# Patient Record
Sex: FEMALE | Race: WHITE | Employment: FULL TIME | URBAN - METROPOLITAN AREA
[De-identification: names, ages, dates, MRNs, and addresses within clinical notes are randomized per-mention and may not be internally consistent; named-entity substitution may affect disease eponyms.]

---

## 2017-01-04 ENCOUNTER — OFFICE VISIT (OUTPATIENT)
Dept: FAMILY MEDICINE CLINIC | Facility: CLINIC | Age: 67
End: 2017-01-04

## 2017-01-04 VITALS — HEART RATE: 71 BPM | OXYGEN SATURATION: 97 % | DIASTOLIC BLOOD PRESSURE: 74 MMHG | SYSTOLIC BLOOD PRESSURE: 104 MMHG

## 2017-01-04 DIAGNOSIS — H01.003: Primary | ICD-10-CM

## 2017-01-04 DIAGNOSIS — L01.00: Primary | ICD-10-CM

## 2017-01-04 DIAGNOSIS — K29.30 CHRONIC SUPERFICIAL GASTRITIS WITHOUT BLEEDING: ICD-10-CM

## 2017-01-04 PROCEDURE — 99213 OFFICE O/P EST LOW 20 MIN: CPT

## 2017-01-04 RX ORDER — CETIRIZINE HYDROCHLORIDE 10 MG/1
10 TABLET ORAL DAILY
Qty: 30 TABLET | Refills: 0 | Status: SHIPPED | OUTPATIENT
Start: 2017-01-04 | End: 2017-02-17 | Stop reason: ALTCHOICE

## 2017-01-04 RX ORDER — OMEPRAZOLE 40 MG/1
40 CAPSULE, DELAYED RELEASE ORAL DAILY
Qty: 90 CAPSULE | Refills: 1 | Status: SHIPPED | OUTPATIENT
Start: 2017-01-04 | End: 2017-05-19

## 2017-01-04 NOTE — PATIENT INSTRUCTIONS
Tratamiento de la blefaritis: Medicamentos y chequeos  Medicamentos  Rangel oftalmólogo podría recetarle colirios o pomadas para aliviar el enrojecimiento, la hinchazón y la irritación.  [place], evite tocarse el párpado con la punt

## 2017-01-04 NOTE — PROGRESS NOTES
HPI:    Patient ID: James Samples is a 77year old female. Eye Problem   The right eye is affected. This is a new problem. Episode onset: 2 weeks. The problem occurs constantly. The problem has been gradually worsening.  The patient is experiencing no pain daily with breakfast. Disp: 90 tablet Rfl: 0   Losartan Potassium-HCTZ 100-25 MG Oral Tab Take 1 tablet by mouth daily. Disp: 90 tablet Rfl: 1   methylPREDNISolone (MEDROL) 4 MG Oral Tablet Therapy Pack As directed.  Disp: 1 kit Rfl: 0   Azelastine HCl 0.05 1 Application topically 2 (two) times daily. cetirizine 10 MG Oral Tab 30 tablet 0      Sig: Take 1 tablet (10 mg total) by mouth daily.       Omeprazole 40 MG Oral Capsule Delayed Release 90 capsule 1      Sig: Take 1 capsule (40 mg total) by mouth da

## 2017-01-18 ENCOUNTER — TELEPHONE (OUTPATIENT)
Dept: FAMILY MEDICINE CLINIC | Facility: CLINIC | Age: 67
End: 2017-01-18

## 2017-01-19 ENCOUNTER — TELEPHONE (OUTPATIENT)
Dept: FAMILY MEDICINE CLINIC | Facility: CLINIC | Age: 67
End: 2017-01-19

## 2017-01-20 ENCOUNTER — OFFICE VISIT (OUTPATIENT)
Dept: FAMILY MEDICINE CLINIC | Facility: CLINIC | Age: 67
End: 2017-01-20

## 2017-01-20 VITALS
WEIGHT: 155 LBS | OXYGEN SATURATION: 95 % | HEART RATE: 74 BPM | HEIGHT: 59.5 IN | SYSTOLIC BLOOD PRESSURE: 116 MMHG | DIASTOLIC BLOOD PRESSURE: 80 MMHG | BODY MASS INDEX: 30.84 KG/M2

## 2017-01-20 DIAGNOSIS — L23.9 ALLERGIC DERMATITIS: Primary | ICD-10-CM

## 2017-01-20 PROCEDURE — 99213 OFFICE O/P EST LOW 20 MIN: CPT

## 2017-01-20 RX ORDER — RANITIDINE 300 MG/1
300 TABLET ORAL DAILY
Qty: 7 TABLET | Refills: 0 | Status: SHIPPED | OUTPATIENT
Start: 2017-01-20 | End: 2017-01-27

## 2017-01-20 RX ORDER — PREDNISONE 20 MG/1
60 TABLET ORAL DAILY
Qty: 15 TABLET | Refills: 0 | Status: SHIPPED | OUTPATIENT
Start: 2017-01-20 | End: 2017-01-25

## 2017-01-21 NOTE — PATIENT INSTRUCTIONS
Urticaria (adulto)  La urticaria es leanna afección en la que hay protuberancias rosadas o rojizas en la piel. Estas protuberancias también se conocen amie “ronchas”. [place] [employer], arder o pinchar.  Pueden aparecer en cualquier parte del c · Pruebe a ponerse leanna crema o espray tópico con benzocaína para ayuda a reducir la picazón. · Use difenhidramina oral para ayudar a reducir la picazón. Es un antihistamínico que puede comprar en farmacias y tiendas de alimentos.  Puede que le cause somnol

## 2017-01-21 NOTE — PROGRESS NOTES
HPI:    Patient ID: Enoch Reilly is a 77year old female. Rash  This is a recurrent problem. The current episode started more than 1 month ago. The problem has been waxing and waning since onset. The affected locations include the face.  The rash is areli Carb-Cholecalciferol (CALCIUM-VITAMIN D3) 600-400 MG-UNIT Oral Tab Take 1 tablet by mouth 2 (two) times daily.  Disp: 180 tablet Rfl: 3   MetFORMIN HCl 850 MG Oral Tab Take 1 tablet (850 mg total) by mouth daily with breakfast. Disp: 90 tablet Rfl: 0   Losa MG Oral Tab 7 tablet 0      Sig: Take 1 tablet (300 mg total) by mouth daily.            Imaging & Referrals:  None       #[id number]

## 2017-02-17 ENCOUNTER — OFFICE VISIT (OUTPATIENT)
Dept: FAMILY MEDICINE CLINIC | Facility: CLINIC | Age: 67
End: 2017-02-17

## 2017-02-17 VITALS
SYSTOLIC BLOOD PRESSURE: 138 MMHG | BODY MASS INDEX: 31 KG/M2 | HEART RATE: 73 BPM | WEIGHT: 154 LBS | DIASTOLIC BLOOD PRESSURE: 70 MMHG | OXYGEN SATURATION: 99 %

## 2017-02-17 DIAGNOSIS — Z01.89 ENCOUNTER FOR ROUTINE LABORATORY TESTING: ICD-10-CM

## 2017-02-17 DIAGNOSIS — I10 ESSENTIAL HYPERTENSION WITH GOAL BLOOD PRESSURE LESS THAN 140/90: ICD-10-CM

## 2017-02-17 DIAGNOSIS — E11.3293 CONTROLLED TYPE 2 DIABETES MELLITUS WITH BOTH EYES AFFECTED BY MILD NONPROLIFERATIVE RETINOPATHY WITHOUT MACULAR EDEMA, WITHOUT LONG-TERM CURRENT USE OF INSULIN (HCC): ICD-10-CM

## 2017-02-17 DIAGNOSIS — J01.01 ACUTE RECURRENT MAXILLARY SINUSITIS: Primary | ICD-10-CM

## 2017-02-17 DIAGNOSIS — L30.9 DERMATITIS: ICD-10-CM

## 2017-02-17 LAB
ALBUMIN SERPL BCP-MCNC: 4.2 G/DL (ref 3.5–4.8)
ALBUMIN/GLOB SERPL: 1.8 {RATIO} (ref 1–2)
ALP SERPL-CCNC: 66 U/L (ref 32–100)
ALT SERPL-CCNC: 17 U/L (ref 14–54)
ANION GAP SERPL CALC-SCNC: 8 MMOL/L (ref 0–18)
AST SERPL-CCNC: 19 U/L (ref 15–41)
BILIRUB SERPL-MCNC: 0.3 MG/DL (ref 0.3–1.2)
BUN SERPL-MCNC: 17 MG/DL (ref 8–20)
BUN/CREAT SERPL: 20.2 (ref 10–20)
CALCIUM SERPL-MCNC: 9.4 MG/DL (ref 8.5–10.5)
CHLORIDE SERPL-SCNC: 104 MMOL/L (ref 95–110)
CHOLEST SERPL-MCNC: 218 MG/DL (ref 110–200)
CO2 SERPL-SCNC: 27 MMOL/L (ref 22–32)
CREAT SERPL-MCNC: 0.84 MG/DL (ref 0.5–1.5)
ERYTHROCYTE [DISTWIDTH] IN BLOOD BY AUTOMATED COUNT: 15.2 % (ref 11–15)
GLOBULIN PLAS-MCNC: 2.4 G/DL (ref 2.5–3.7)
GLUCOSE SERPL-MCNC: 97 MG/DL (ref 70–99)
HCT VFR BLD AUTO: 30.2 % (ref 35–48)
HDLC SERPL-MCNC: 49 MG/DL
HGB BLD-MCNC: 9.5 G/DL (ref 12–16)
LDLC SERPL CALC-MCNC: 126 MG/DL (ref 0–99)
MCH RBC QN AUTO: 19.3 PG (ref 27–32)
MCHC RBC AUTO-ENTMCNC: 31.4 G/DL (ref 32–37)
MCV RBC AUTO: 61.5 FL (ref 80–100)
NONHDLC SERPL-MCNC: 169 MG/DL
OSMOLALITY UR CALC.SUM OF ELEC: 289 MOSM/KG (ref 275–295)
PLATELET # BLD AUTO: 153 K/UL (ref 140–400)
PMV BLD AUTO: 11.2 FL (ref 7.4–10.3)
POTASSIUM SERPL-SCNC: 4.5 MMOL/L (ref 3.3–5.1)
PROT SERPL-MCNC: 6.6 G/DL (ref 5.9–8.4)
RBC # BLD AUTO: 4.91 M/UL (ref 3.7–5.4)
SODIUM SERPL-SCNC: 139 MMOL/L (ref 136–144)
TRIGL SERPL-MCNC: 217 MG/DL (ref 1–149)
WBC # BLD AUTO: 4.7 K/UL (ref 4–11)

## 2017-02-17 PROCEDURE — 80061 LIPID PANEL: CPT

## 2017-02-17 PROCEDURE — 83036 HEMOGLOBIN GLYCOSYLATED A1C: CPT

## 2017-02-17 PROCEDURE — 80053 COMPREHEN METABOLIC PANEL: CPT

## 2017-02-17 PROCEDURE — 36415 COLL VENOUS BLD VENIPUNCTURE: CPT

## 2017-02-17 PROCEDURE — 99214 OFFICE O/P EST MOD 30 MIN: CPT

## 2017-02-17 PROCEDURE — 85027 COMPLETE CBC AUTOMATED: CPT

## 2017-02-17 RX ORDER — LOSARTAN POTASSIUM AND HYDROCHLOROTHIAZIDE 25; 100 MG/1; MG/1
1 TABLET ORAL DAILY
Qty: 90 TABLET | Refills: 1 | Status: SHIPPED | OUTPATIENT
Start: 2017-02-17 | End: 2017-05-19

## 2017-02-17 RX ORDER — LEVOFLOXACIN 750 MG/1
750 TABLET ORAL DAILY
Qty: 5 TABLET | Refills: 0 | Status: SHIPPED | OUTPATIENT
Start: 2017-02-17 | End: 2017-02-22

## 2017-02-18 LAB — HBA1C MFR BLD: 6.5 % (ref 4–6)

## 2017-02-18 NOTE — PROGRESS NOTES
HPI:    Patient ID: Waqar Jluis is a 77year old female. Sinus Problem  This is a recurrent problem. Episode onset: 1 week ago. The problem has been gradually worsening since onset. There has been no fever. The pain is moderate.  Associated symptoms incl levofloxacin 750 MG Oral Tab Take 1 tablet (750 mg total) by mouth daily. Disp: 5 tablet Rfl: 0   Omeprazole 40 MG Oral Capsule Delayed Release Take 1 capsule (40 mg total) by mouth daily.  Disp: 90 capsule Rfl: 1   Calcium Carb-Cholecalciferol (CALCIUM-V baths and/or neti pot nasal rinses recommended. Rx for Levaquin given. 2. Dermatitis  Will refer to Dr. Gina Parmar (Dermatology) for evaluation.     3. 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

## 2017-02-18 NOTE — PATIENT INSTRUCTIONS
Cómo prevenir la sinusitis    Los resfriados, la gripe y las alergias pueden conducir fácilmente a la sinusitis.  Leonor todo lo que pueda para prevenir la sinusitis evitando estos problemas que la causan; procure a amie dé lugar no contraer resfriados u ot Si mantiene húmedos blake senos paranasales, el moco será más líquido y se podrá drenar más fácilmente; a francisco vez, esto ayuda a prevenir infecciones.  Pregunte a francisco médico sobre estas sugerencias:  · Use un humidificador; limpie regularmente el depósito para e

## 2017-02-20 DIAGNOSIS — E78.2 MIXED HYPERLIPIDEMIA: ICD-10-CM

## 2017-02-20 DIAGNOSIS — E11.3299 CONTROLLED TYPE 2 DIABETES MELLITUS WITH MILD NONPROLIFERATIVE RETINOPATHY WITHOUT MACULAR EDEMA, WITHOUT LONG-TERM CURRENT USE OF INSULIN, UNSPECIFIED LATERALITY (HCC): Primary | ICD-10-CM

## 2017-02-20 DIAGNOSIS — D50.9 IRON DEFICIENCY ANEMIA, UNSPECIFIED IRON DEFICIENCY ANEMIA TYPE: ICD-10-CM

## 2017-02-20 RX ORDER — SIMVASTATIN 20 MG
20 TABLET ORAL NIGHTLY
Qty: 90 TABLET | Refills: 0 | Status: SHIPPED | OUTPATIENT
Start: 2017-02-20 | End: 2017-05-19

## 2017-02-20 RX ORDER — MELATONIN
325 2 TIMES DAILY WITH MEALS
Qty: 180 TABLET | Refills: 0 | Status: SHIPPED | OUTPATIENT
Start: 2017-02-20 | End: 2017-05-19

## 2017-02-21 ENCOUNTER — TELEPHONE (OUTPATIENT)
Dept: FAMILY MEDICINE CLINIC | Facility: CLINIC | Age: 67
End: 2017-02-21

## 2017-02-21 RX ORDER — NAPROXEN 500 MG/1
500 TABLET ORAL 2 TIMES DAILY WITH MEALS
Qty: 60 TABLET | Refills: 0 | Status: SHIPPED | OUTPATIENT
Start: 2017-02-21 | End: 2017-03-23

## 2017-02-27 ENCOUNTER — TELEPHONE (OUTPATIENT)
Dept: FAMILY MEDICINE CLINIC | Facility: CLINIC | Age: 67
End: 2017-02-27

## 2017-03-10 ENCOUNTER — OFFICE VISIT (OUTPATIENT)
Dept: FAMILY MEDICINE CLINIC | Facility: CLINIC | Age: 67
End: 2017-03-10

## 2017-03-10 VITALS
BODY MASS INDEX: 30 KG/M2 | SYSTOLIC BLOOD PRESSURE: 120 MMHG | DIASTOLIC BLOOD PRESSURE: 70 MMHG | RESPIRATION RATE: 16 BRPM | OXYGEN SATURATION: 98 % | WEIGHT: 152 LBS | HEART RATE: 70 BPM

## 2017-03-10 DIAGNOSIS — M77.01 MEDIAL EPICONDYLITIS OF RIGHT ELBOW: ICD-10-CM

## 2017-03-10 DIAGNOSIS — M26.623 BILATERAL TEMPOROMANDIBULAR JOINT PAIN: Primary | ICD-10-CM

## 2017-03-10 DIAGNOSIS — M79.631 RIGHT FOREARM PAIN: ICD-10-CM

## 2017-03-10 PROCEDURE — 99213 OFFICE O/P EST LOW 20 MIN: CPT

## 2017-03-10 RX ORDER — METHYLPREDNISOLONE 4 MG/1
TABLET ORAL
Qty: 1 KIT | Refills: 0 | Status: SHIPPED | OUTPATIENT
Start: 2017-03-10 | End: 2017-05-19 | Stop reason: ALTCHOICE

## 2017-03-10 RX ORDER — HYDROCODONE BITARTRATE AND IBUPROFEN 7.5; 2 MG/1; MG/1
1 TABLET, FILM COATED ORAL EVERY 8 HOURS PRN
Qty: 90 TABLET | Refills: 0 | Status: SHIPPED | OUTPATIENT
Start: 2017-03-10 | End: 2017-04-21

## 2017-03-11 PROBLEM — M79.631 RIGHT FOREARM PAIN: Status: ACTIVE | Noted: 2017-03-11

## 2017-03-11 PROBLEM — L01.00: Status: RESOLVED | Noted: 2017-01-04 | Resolved: 2017-03-11

## 2017-03-11 PROBLEM — L23.9 ALLERGIC DERMATITIS: Status: RESOLVED | Noted: 2017-01-20 | Resolved: 2017-03-11

## 2017-03-11 PROBLEM — M77.02 MEDIAL EPICONDYLITIS OF LEFT ELBOW: Status: ACTIVE | Noted: 2017-03-11

## 2017-03-11 PROBLEM — M85.80 OSTEOPENIA: Status: ACTIVE | Noted: 2017-03-11

## 2017-03-11 PROBLEM — M77.01 MEDIAL EPICONDYLITIS OF RIGHT ELBOW: Status: ACTIVE | Noted: 2017-03-11

## 2017-03-11 PROBLEM — D50.9 MICROCYTIC ANEMIA: Status: ACTIVE | Noted: 2017-03-11

## 2017-03-11 PROBLEM — H01.003: Status: RESOLVED | Noted: 2017-01-04 | Resolved: 2017-03-11

## 2017-03-11 PROBLEM — Z01.89 ENCOUNTER FOR ROUTINE LABORATORY TESTING: Status: RESOLVED | Noted: 2017-02-17 | Resolved: 2017-03-11

## 2017-03-11 PROBLEM — M26.623 BILATERAL TEMPOROMANDIBULAR JOINT PAIN: Status: ACTIVE | Noted: 2017-03-11

## 2017-03-11 NOTE — PROGRESS NOTES
HPI:    Patient ID: Alber Strickland is a 77year old female. Arm Pain   The pain is present in the right arm and right elbow. This is a new problem. Episode onset: 2 weeks ago. There has been no history of extremity trauma. The problem occurs constantly.  Marlene Olmedo methylPREDNISolone (MEDROL) 4 MG Oral Tablet Therapy Pack As directed. Disp: 1 kit Rfl: 0   naproxen 500 MG Oral Tab Take 1 tablet (500 mg total) by mouth 2 (two) times daily with meals.  Disp: 60 tablet Rfl: 0   MetFORMIN HCl 850 MG Oral Tab Take 1 table epicondylitis of right elbow  3. Right forearm pain  RICE (Rest, Icing, Compression, and Elevation) technique reviewed and discussed with pt. Rx for Medrol dose pack and Vicoprofen given. RTC if symptoms worsen or fail to improve.       No orders of the

## 2017-03-11 NOTE — PATIENT INSTRUCTIONS
Los trastornos temporomandibulares (TTM)  ¿Tiene dolor en la jadyn, en la mandíbula o en los dientes? ¿Tiene dificultades para masticar? ¿Oye chasquidos o ruidos secos procedentes de la mandíbula?  Estos síntomas pueden ser causados por trastornos temporom · Fisioterapia para reducir la presión [employer] articulación y restablecer francisco funcionamiento. · Tratamiento dental para reducir la presión sobre la articulación. ¿Cómo puede evitar problemas en un futuro?   El tratamiento puede ayudarle a aliviar francisco probl

## 2017-04-07 ENCOUNTER — TELEPHONE (OUTPATIENT)
Dept: FAMILY MEDICINE CLINIC | Facility: CLINIC | Age: 67
End: 2017-04-07

## 2017-04-07 DIAGNOSIS — H40.9 GLAUCOMA OF BOTH EYES, UNSPECIFIED GLAUCOMA: Primary | ICD-10-CM

## 2017-04-07 NOTE — TELEPHONE ENCOUNTER
Referral can not be issue to go see Dr Titus Angulo but he is not longer under [facility], she got referred to Dr Arminda Cotter instead.   Patient is aware to call me back on Monday to find out the status of the referral.

## 2017-04-10 ENCOUNTER — TELEPHONE (OUTPATIENT)
Dept: FAMILY MEDICINE CLINIC | Facility: CLINIC | Age: 67
End: 2017-04-10

## 2017-04-10 DIAGNOSIS — H40.9 GLAUCOMA OF BOTH EYES, UNSPECIFIED GLAUCOMA: Primary | ICD-10-CM

## 2017-04-21 ENCOUNTER — OFFICE VISIT (OUTPATIENT)
Dept: FAMILY MEDICINE CLINIC | Facility: CLINIC | Age: 67
End: 2017-04-21

## 2017-04-21 VITALS
BODY MASS INDEX: 30.24 KG/M2 | HEART RATE: 70 BPM | DIASTOLIC BLOOD PRESSURE: 70 MMHG | HEIGHT: 59.5 IN | TEMPERATURE: 98 F | WEIGHT: 152 LBS | SYSTOLIC BLOOD PRESSURE: 138 MMHG | OXYGEN SATURATION: 96 %

## 2017-04-21 DIAGNOSIS — H91.93 BILATERAL HEARING LOSS, UNSPECIFIED HEARING LOSS TYPE: ICD-10-CM

## 2017-04-21 DIAGNOSIS — J30.89 NON-SEASONAL ALLERGIC RHINITIS, UNSPECIFIED ALLERGIC RHINITIS TRIGGER: Primary | ICD-10-CM

## 2017-04-21 PROCEDURE — 99213 OFFICE O/P EST LOW 20 MIN: CPT

## 2017-04-21 RX ORDER — LEVOCETIRIZINE DIHYDROCHLORIDE 5 MG/1
5 TABLET, FILM COATED ORAL EVERY EVENING
Qty: 30 TABLET | Refills: 0 | Status: SHIPPED | OUTPATIENT
Start: 2017-04-21 | End: 2017-05-19

## 2017-04-21 RX ORDER — AZELASTINE HCL 205.5 UG/1
2 SPRAY NASAL 2 TIMES DAILY
Qty: 30 ML | Refills: 0 | Status: SHIPPED | OUTPATIENT
Start: 2017-04-21 | End: 2017-05-19

## 2017-04-21 RX ORDER — HYDROCODONE BITARTRATE AND IBUPROFEN 7.5; 2 MG/1; MG/1
1 TABLET, FILM COATED ORAL EVERY 8 HOURS PRN
Qty: 90 TABLET | Refills: 0 | Status: SHIPPED | OUTPATIENT
Start: 2017-04-21 | End: 2017-12-08 | Stop reason: ALTCHOICE

## 2017-04-24 RX ORDER — LEVOCETIRIZINE DIHYDROCHLORIDE 5 MG/1
TABLET, FILM COATED ORAL
Qty: 90 TABLET | Refills: 0 | OUTPATIENT
Start: 2017-04-24

## 2017-05-04 ENCOUNTER — TELEPHONE (OUTPATIENT)
Dept: FAMILY MEDICINE CLINIC | Facility: CLINIC | Age: 67
End: 2017-05-04

## 2017-05-17 ENCOUNTER — NURSE ONLY (OUTPATIENT)
Dept: FAMILY MEDICINE CLINIC | Facility: CLINIC | Age: 67
End: 2017-05-17

## 2017-05-17 DIAGNOSIS — E78.2 MIXED HYPERLIPIDEMIA: ICD-10-CM

## 2017-05-17 DIAGNOSIS — E11.3299 CONTROLLED TYPE 2 DIABETES MELLITUS WITH MILD NONPROLIFERATIVE RETINOPATHY WITHOUT MACULAR EDEMA, WITHOUT LONG-TERM CURRENT USE OF INSULIN, UNSPECIFIED LATERALITY (HCC): ICD-10-CM

## 2017-05-17 DIAGNOSIS — E11.3299 CONTROLLED TYPE 2 DIABETES MELLITUS WITH MILD NONPROLIFERATIVE RETINOPATHY WITHOUT MACULAR EDEMA, WITHOUT LONG-TERM CURRENT USE OF INSULIN (HCC): ICD-10-CM

## 2017-05-17 DIAGNOSIS — D50.9 IRON DEFICIENCY ANEMIA, UNSPECIFIED IRON DEFICIENCY ANEMIA TYPE: ICD-10-CM

## 2017-05-17 PROCEDURE — 85025 COMPLETE CBC W/AUTO DIFF WBC: CPT

## 2017-05-17 PROCEDURE — 36415 COLL VENOUS BLD VENIPUNCTURE: CPT

## 2017-05-17 PROCEDURE — 80061 LIPID PANEL: CPT

## 2017-05-17 PROCEDURE — 80053 COMPREHEN METABOLIC PANEL: CPT

## 2017-05-17 PROCEDURE — 83036 HEMOGLOBIN GLYCOSYLATED A1C: CPT

## 2017-05-19 ENCOUNTER — OFFICE VISIT (OUTPATIENT)
Dept: FAMILY MEDICINE CLINIC | Facility: CLINIC | Age: 67
End: 2017-05-19

## 2017-05-19 VITALS
HEART RATE: 80 BPM | SYSTOLIC BLOOD PRESSURE: 136 MMHG | WEIGHT: 153 LBS | DIASTOLIC BLOOD PRESSURE: 70 MMHG | BODY MASS INDEX: 30.84 KG/M2 | OXYGEN SATURATION: 97 % | HEIGHT: 59 IN

## 2017-05-19 DIAGNOSIS — Z13.31 DEPRESSION SCREENING: ICD-10-CM

## 2017-05-19 DIAGNOSIS — D50.9 MICROCYTIC ANEMIA: ICD-10-CM

## 2017-05-19 DIAGNOSIS — J30.89 NON-SEASONAL ALLERGIC RHINITIS, UNSPECIFIED ALLERGIC RHINITIS TRIGGER: ICD-10-CM

## 2017-05-19 DIAGNOSIS — I10 ESSENTIAL HYPERTENSION WITH GOAL BLOOD PRESSURE LESS THAN 140/90: ICD-10-CM

## 2017-05-19 DIAGNOSIS — K64.4 EXTERNAL HEMORRHOIDS: ICD-10-CM

## 2017-05-19 DIAGNOSIS — Z28.21 IMMUNIZATION NOT CARRIED OUT BECAUSE OF PATIENT REFUSAL: ICD-10-CM

## 2017-05-19 DIAGNOSIS — E11.3293 CONTROLLED TYPE 2 DIABETES MELLITUS WITH BOTH EYES AFFECTED BY MILD NONPROLIFERATIVE RETINOPATHY WITHOUT MACULAR EDEMA, WITHOUT LONG-TERM CURRENT USE OF INSULIN (HCC): ICD-10-CM

## 2017-05-19 DIAGNOSIS — H91.93 BILATERAL HEARING LOSS, UNSPECIFIED HEARING LOSS TYPE: ICD-10-CM

## 2017-05-19 DIAGNOSIS — J06.9 VIRAL UPPER RESPIRATORY TRACT INFECTION: ICD-10-CM

## 2017-05-19 DIAGNOSIS — E78.2 MIXED HYPERLIPIDEMIA: ICD-10-CM

## 2017-05-19 DIAGNOSIS — E66.09 NON MORBID OBESITY DUE TO EXCESS CALORIES: ICD-10-CM

## 2017-05-19 DIAGNOSIS — Z78.0 POSTMENOPAUSAL: ICD-10-CM

## 2017-05-19 DIAGNOSIS — M72.2 BILATERAL PLANTAR FASCIITIS: ICD-10-CM

## 2017-05-19 DIAGNOSIS — K57.30 DIVERTICULOSIS OF SIGMOID COLON: ICD-10-CM

## 2017-05-19 DIAGNOSIS — J34.2 DEVIATED NASAL SEPTUM: ICD-10-CM

## 2017-05-19 DIAGNOSIS — H40.9 GLAUCOMA OF BOTH EYES, UNSPECIFIED GLAUCOMA: ICD-10-CM

## 2017-05-19 DIAGNOSIS — Z00.00 ENCOUNTER FOR MEDICARE ANNUAL WELLNESS EXAM: Primary | ICD-10-CM

## 2017-05-19 DIAGNOSIS — M85.89 OSTEOPENIA OF MULTIPLE SITES: ICD-10-CM

## 2017-05-19 DIAGNOSIS — K29.30 CHRONIC SUPERFICIAL GASTRITIS WITHOUT BLEEDING: ICD-10-CM

## 2017-05-19 PROBLEM — L30.9 DERMATITIS: Status: RESOLVED | Noted: 2017-02-17 | Resolved: 2017-05-19

## 2017-05-19 PROBLEM — M79.631 RIGHT FOREARM PAIN: Status: RESOLVED | Noted: 2017-03-11 | Resolved: 2017-05-19

## 2017-05-19 PROBLEM — M77.01 MEDIAL EPICONDYLITIS OF RIGHT ELBOW: Status: RESOLVED | Noted: 2017-03-11 | Resolved: 2017-05-19

## 2017-05-19 PROCEDURE — 82728 ASSAY OF FERRITIN: CPT

## 2017-05-19 PROCEDURE — 81003 URINALYSIS AUTO W/O SCOPE: CPT

## 2017-05-19 PROCEDURE — 99397 PER PM REEVAL EST PAT 65+ YR: CPT

## 2017-05-19 PROCEDURE — 82570 ASSAY OF URINE CREATININE: CPT

## 2017-05-19 PROCEDURE — 99212 OFFICE O/P EST SF 10 MIN: CPT

## 2017-05-19 PROCEDURE — 96160 PT-FOCUSED HLTH RISK ASSMT: CPT

## 2017-05-19 PROCEDURE — 82043 UR ALBUMIN QUANTITATIVE: CPT

## 2017-05-19 PROCEDURE — G0438 PPPS, INITIAL VISIT: HCPCS

## 2017-05-19 RX ORDER — MULTIVIT,IRON,MINERALS/LUTEIN
1 TABLET ORAL 2 TIMES DAILY
Qty: 180 TABLET | Refills: 3 | Status: SHIPPED | OUTPATIENT
Start: 2017-05-19

## 2017-05-19 RX ORDER — LOSARTAN POTASSIUM AND HYDROCHLOROTHIAZIDE 25; 100 MG/1; MG/1
1 TABLET ORAL DAILY
Qty: 90 TABLET | Refills: 1 | Status: SHIPPED | OUTPATIENT
Start: 2017-05-19 | End: 2017-12-01

## 2017-05-19 RX ORDER — SIMVASTATIN 20 MG
20 TABLET ORAL NIGHTLY
Qty: 90 TABLET | Refills: 1 | Status: SHIPPED | OUTPATIENT
Start: 2017-05-19

## 2017-05-19 RX ORDER — NAPROXEN 500 MG/1
500 TABLET ORAL 2 TIMES DAILY WITH MEALS
Qty: 60 TABLET | Refills: 0 | Status: SHIPPED | OUTPATIENT
Start: 2017-05-19 | End: 2017-05-19

## 2017-05-19 RX ORDER — OMEPRAZOLE 40 MG/1
40 CAPSULE, DELAYED RELEASE ORAL DAILY
Qty: 90 CAPSULE | Refills: 1 | Status: SHIPPED | OUTPATIENT
Start: 2017-05-19 | End: 2017-12-12

## 2017-05-19 RX ORDER — MELATONIN
325 2 TIMES DAILY WITH MEALS
Qty: 180 TABLET | Refills: 3 | Status: SHIPPED | OUTPATIENT
Start: 2017-05-19

## 2017-05-19 RX ORDER — PROMETHAZINE HYDROCHLORIDE AND CODEINE PHOSPHATE 6.25; 1 MG/5ML; MG/5ML
5 SYRUP ORAL EVERY 6 HOURS PRN
Qty: 240 ML | Refills: 0 | Status: SHIPPED | OUTPATIENT
Start: 2017-05-19 | End: 2017-09-29 | Stop reason: ALTCHOICE

## 2017-05-20 PROBLEM — J06.9 VIRAL UPPER RESPIRATORY TRACT INFECTION: Status: ACTIVE | Noted: 2017-05-20

## 2017-05-20 PROBLEM — M72.2 BILATERAL PLANTAR FASCIITIS: Status: ACTIVE | Noted: 2017-05-20

## 2017-05-20 PROBLEM — Z00.00 ENCOUNTER FOR MEDICARE ANNUAL WELLNESS EXAM: Status: ACTIVE | Noted: 2017-05-20

## 2017-05-20 PROBLEM — Z13.31 DEPRESSION SCREENING: Status: ACTIVE | Noted: 2017-05-20

## 2017-05-20 PROBLEM — M26.623 BILATERAL TEMPOROMANDIBULAR JOINT PAIN: Status: RESOLVED | Noted: 2017-03-11 | Resolved: 2017-05-20

## 2017-05-20 NOTE — PROGRESS NOTES
HPI:   Rafita Archer is a 77year old female who presents for a MA (Medicare Advantage) [facility] (Once per calendar year). She c/o chest congestion and productive cough for the past 10 days.  She has been taking some OTC cough medicines w/o much relief of recent labs)     Lab Results  Component Value Date   WBC 5.3 05/17/2017   HGB 9.2* 05/17/2017    05/17/2017        ALLERGIES:   She has No Known Allergies.     CURRENT MEDICATIONS:     Outpatient Prescriptions Marked as Taking for the 5/19/17 encount or double vision  HEENT: denies nasal congestion, sinus pain or ST  LUNGS: denies shortness of breath with exertion, + chest congestion and cough  CARDIOVASCULAR: denies chest pain on exertion  GI: denies abdominal pain, + heartburn  : denies dysuria, va respirations unlabored   Heart:  Regular rate and rhythm, S1 and S2 normal, no murmur, rub, or gallop   Abdomen:   Soft, non-tender, bowel sounds active all four quadrants,  no masses, no organomegaly   Pelvic: Deferred   Extremities: Extremities normal, a drops as previously instructed. - Follow up with Dr. Mitchell Ramos (Ophthalmology) as scheduled. Essential hypertension with goal blood pressure less than 140/90  -     Pt advised to decrease amount of salt intake in her diet.   - Will continue BP monitoring Pneumovax, Tdap, and Zostavax vaccines refused. Non morbid obesity due to excess calories  Body mass index (BMI) of 30.0-30.9 in adult  -     Pt counseled with regards to diet and exercise. Ms. Musa Conrad does not currently take aspirin.  We discussed t without help    Taking medications as prescribed: Able without help    Are you able to afford your medications?: Yes    Hearing Problems?: Yes     Functional Status     Hearing Problems?: Yes    Vision Problems? : Yes    Difficulty walking?: No    Difficul GLUCOSE (mg/dL)   Date Value   05/17/2017 102*   ----------       Cardiovascular Disease Screening     LDL Annually LDL CHOLESTEROL (mg/dL)   Date Value   05/17/2017 70        EKG - w/ Initial Preventative Physical Exam only, or if medically necessary El concentrates   Clients of institutions for the mentally retarded   Persons who live in the same house as a HepB virus carrier   Homosexual men   Illicit injectable drug abusers     Tetanus Toxoid  Only covered with a cut with metal- TD and TDaP Not covered

## 2017-05-20 NOTE — PATIENT INSTRUCTIONS
Caridad Hubert Reyes's SCREENING SCHEDULE   Tests on this list are recommended by your physician but may not be covered, or covered at this frequency, by your insurer. Please check with your insurance carrier before scheduling to verify coverage.    PREVENTATIVE S Covered every 10 years- more often if abnormal Colonoscopy,10 Years due on 08/13/2000 Update Health Maintenance if applicable    Flex Sigmoidoscopy Screen  Covered every 5 years No results found for this or any previous visit. No flowsheet data found. previous visit. Please get once after your 65th birthday    Pneumococcal 23 (Pneumovax)  Covered Once after 65 No orders found for this or any previous visit.  Please get once after your 65th birthday    Hepatitis B for Moderate/High Risk       No orders fo

## 2017-05-22 RX ORDER — NAPROXEN 500 MG/1
TABLET ORAL
Qty: 180 TABLET | Refills: 0 | Status: SHIPPED | OUTPATIENT
Start: 2017-05-22 | End: 2017-12-01

## 2017-06-19 ENCOUNTER — MED REC SCAN ONLY (OUTPATIENT)
Dept: FAMILY MEDICINE CLINIC | Facility: CLINIC | Age: 67
End: 2017-06-19

## 2017-06-30 ENCOUNTER — OFFICE VISIT (OUTPATIENT)
Dept: OTOLARYNGOLOGY | Facility: CLINIC | Age: 67
End: 2017-06-30

## 2017-06-30 ENCOUNTER — OFFICE VISIT (OUTPATIENT)
Dept: AUDIOLOGY | Facility: CLINIC | Age: 67
End: 2017-06-30

## 2017-06-30 VITALS
HEIGHT: 65 IN | BODY MASS INDEX: 25.83 KG/M2 | TEMPERATURE: 97 F | SYSTOLIC BLOOD PRESSURE: 106 MMHG | DIASTOLIC BLOOD PRESSURE: 70 MMHG | WEIGHT: 155 LBS

## 2017-06-30 DIAGNOSIS — M54.2 CERVICALGIA: ICD-10-CM

## 2017-06-30 DIAGNOSIS — H90.3 SENSORINEURAL HEARING LOSS, BILATERAL: Primary | ICD-10-CM

## 2017-06-30 DIAGNOSIS — H93.12 RINGING IN THE EAR, LEFT: Primary | ICD-10-CM

## 2017-06-30 PROCEDURE — 92557 COMPREHENSIVE HEARING TEST: CPT | Performed by: AUDIOLOGIST

## 2017-06-30 PROCEDURE — G0463 HOSPITAL OUTPT CLINIC VISIT: HCPCS | Performed by: OTOLARYNGOLOGY

## 2017-06-30 PROCEDURE — 99214 OFFICE O/P EST MOD 30 MIN: CPT | Performed by: OTOLARYNGOLOGY

## 2017-06-30 PROCEDURE — 92567 TYMPANOMETRY: CPT | Performed by: AUDIOLOGIST

## 2017-06-30 RX ORDER — CALCIUM CARBONATE/VITAMIN D3 600 MG-10
TABLET ORAL
Refills: 3 | COMMUNITY
Start: 2017-05-19 | End: 2017-12-08

## 2017-06-30 RX ORDER — FERROUS SULFATE 325(65) MG
TABLET ORAL
Refills: 3 | COMMUNITY
Start: 2017-05-19 | End: 2017-12-08

## 2017-07-01 NOTE — PROGRESS NOTES
AUDIOLOGY REPORT      Val Alejandro is a 77year old female     Referring Provider: Dalila Diaz   YOB: 1950  Medical Record: [id number]      Patient Hearing History:   Patient is here with  who assisted with translation for patient.

## 2017-07-05 ENCOUNTER — TELEPHONE (OUTPATIENT)
Dept: OTOLARYNGOLOGY | Facility: CLINIC | Age: 67
End: 2017-07-05

## 2017-07-05 NOTE — TELEPHONE ENCOUNTER
Called pt's insurance [phone], spoke with Loyda Griffin, authorization number [id number], valid for 45 days, expires 8-19-17. Left message on home and mobile phone number for pt to call back to inform that she can schedule MRI spine at this time.  The

## 2017-07-28 RX ORDER — AMOXICILLIN AND CLAVULANATE POTASSIUM 875; 125 MG/1; MG/1
1 TABLET, FILM COATED ORAL 2 TIMES DAILY
Qty: 20 TABLET | Refills: 0 | Status: SHIPPED | OUTPATIENT
Start: 2017-07-28 | End: 2017-08-07

## 2017-08-04 ENCOUNTER — HOSPITAL ENCOUNTER (OUTPATIENT)
Dept: MRI IMAGING | Facility: HOSPITAL | Age: 67
Discharge: HOME | End: 2017-08-04
Attending: OTOLARYNGOLOGY
Payer: MEDICARE

## 2017-08-04 DIAGNOSIS — M54.2 CERVICALGIA: ICD-10-CM

## 2017-08-04 PROCEDURE — 72141 MRI NECK SPINE W/O DYE: CPT | Performed by: OTOLARYNGOLOGY

## 2017-08-07 ENCOUNTER — TELEPHONE (OUTPATIENT)
Dept: FAMILY MEDICINE CLINIC | Facility: CLINIC | Age: 67
End: 2017-08-07

## 2017-08-07 NOTE — TELEPHONE ENCOUNTER
Called spouse, per MD to take patient to walk-in clinic or ER just to make sure it is not cellulitis (swollen, red, painful - patient can see, but a little blurry).   Spouse asked if they should just go to the ophthalmologist instead, I suggested that just

## 2017-08-07 NOTE — TELEPHONE ENCOUNTER
Spouse is calling to bring in [place]is for a swollen/red eye. If we can see her today after 5pm or prescribe something to the pharmacy.

## 2017-09-29 ENCOUNTER — OFFICE VISIT (OUTPATIENT)
Dept: FAMILY MEDICINE CLINIC | Facility: CLINIC | Age: 67
End: 2017-09-29

## 2017-09-29 ENCOUNTER — TELEPHONE (OUTPATIENT)
Dept: OTOLARYNGOLOGY | Facility: CLINIC | Age: 67
End: 2017-09-29

## 2017-09-29 VITALS
SYSTOLIC BLOOD PRESSURE: 106 MMHG | HEART RATE: 81 BPM | WEIGHT: 156 LBS | OXYGEN SATURATION: 97 % | BODY MASS INDEX: 26 KG/M2 | DIASTOLIC BLOOD PRESSURE: 70 MMHG

## 2017-09-29 DIAGNOSIS — Z23 NEED FOR INFLUENZA VACCINATION: ICD-10-CM

## 2017-09-29 DIAGNOSIS — R14.0 ABDOMINAL DISTENSION (GASEOUS): Primary | ICD-10-CM

## 2017-09-29 DIAGNOSIS — H10.12 ALLERGIC CONJUNCTIVITIS OF LEFT EYE: ICD-10-CM

## 2017-09-29 DIAGNOSIS — L30.9 DERMATITIS: ICD-10-CM

## 2017-09-29 PROCEDURE — 90653 IIV ADJUVANT VACCINE IM: CPT

## 2017-09-29 PROCEDURE — 99214 OFFICE O/P EST MOD 30 MIN: CPT

## 2017-09-29 PROCEDURE — G0008 ADMIN INFLUENZA VIRUS VAC: HCPCS

## 2017-09-29 RX ORDER — AZELASTINE HYDROCHLORIDE 0.5 MG/ML
1 SOLUTION/ DROPS OPHTHALMIC 2 TIMES DAILY
Qty: 1 BOTTLE | Refills: 0 | Status: SHIPPED | OUTPATIENT
Start: 2017-09-29

## 2017-09-29 RX ORDER — TRIAMCINOLONE ACETONIDE 0.25 MG/G
1 CREAM TOPICAL 2 TIMES DAILY
Qty: 30 G | Refills: 0 | Status: SHIPPED | OUTPATIENT
Start: 2017-09-29

## 2017-09-29 RX ORDER — DICYCLOMINE HCL 20 MG
20 TABLET ORAL 4 TIMES DAILY PRN
Qty: 90 TABLET | Refills: 0 | Status: SHIPPED | OUTPATIENT
Start: 2017-09-29

## 2017-09-29 NOTE — PROGRESS NOTES
HPI:    Patient ID: Maxine Montgomery is a 79year old female. Abdominal Pain   This is a new problem. Episode onset: 1 week ago. The onset quality is sudden. The problem occurs intermittently. The problem has been waxing and waning.  The pain is located in th change and weight loss. HENT: Negative for congestion, ear pain, rhinorrhea and sore throat. Eyes: Positive for itching. Negative for blurred vision, double vision, photophobia, pain, discharge, redness and visual disturbance.    Respiratory: Negative 1   Calcium Carb-Cholecalciferol (CALCIUM-VITAMIN D3) 600-400 MG-UNIT Oral Tab Take 1 tablet by mouth 2 (two) times daily.  Disp: 180 tablet Rfl: 3   ferrous sulfate 325 (65 FE) MG Oral Tab EC Take 1 tablet (325 mg total) by mouth 2 (two) times daily with m Disp Refills    Dicyclomine HCl (BENTYL) 20 MG Oral Tab 90 tablet 0      Sig: Take 1 tablet (20 mg total) by mouth 4 (four) times daily as needed.       Azelastine HCl 0.05 % Ophthalmic Solution 1 Bottle 0      Sig: Place 1 drop into both eyes 2 (two) times

## 2017-09-30 NOTE — PATIENT INSTRUCTIONS
Conjuntivitis, Alérgica [Conjunctivitis, Allergic]    La conjuntivitis alérgica (Allergic Conjunctivitis) es leanna reacción al polvo o el polen que pudiese maximilian en el aire. Provoca comezón y enrojecimiento en las membranas de los párpados.  También puede p si algo de lo siguiente ocurre:  · Mayor hinchazón del párpado. · [address], nueva o que empeora. · Mayor enrojecimiento alrededor del tanner. · Hinchazón de la jadyn.   Date Last Reviewed: 6/14/2015  © 5843-1535 [employer]

## 2017-12-01 ENCOUNTER — OFFICE VISIT (OUTPATIENT)
Dept: FAMILY MEDICINE CLINIC | Facility: CLINIC | Age: 67
End: 2017-12-01

## 2017-12-01 VITALS
OXYGEN SATURATION: 97 % | SYSTOLIC BLOOD PRESSURE: 120 MMHG | HEART RATE: 72 BPM | WEIGHT: 155 LBS | DIASTOLIC BLOOD PRESSURE: 68 MMHG | RESPIRATION RATE: 18 BRPM | BODY MASS INDEX: 26 KG/M2

## 2017-12-01 DIAGNOSIS — M94.0 COSTOCHONDRITIS: Primary | ICD-10-CM

## 2017-12-01 DIAGNOSIS — E11.3293 CONTROLLED TYPE 2 DIABETES MELLITUS WITH BOTH EYES AFFECTED BY MILD NONPROLIFERATIVE RETINOPATHY WITHOUT MACULAR EDEMA, WITHOUT LONG-TERM CURRENT USE OF INSULIN (HCC): ICD-10-CM

## 2017-12-01 DIAGNOSIS — I10 ESSENTIAL HYPERTENSION WITH GOAL BLOOD PRESSURE LESS THAN 140/90: ICD-10-CM

## 2017-12-01 DIAGNOSIS — F41.9 ANXIETY: ICD-10-CM

## 2017-12-01 PROBLEM — J06.9 VIRAL UPPER RESPIRATORY TRACT INFECTION: Status: RESOLVED | Noted: 2017-05-20 | Resolved: 2017-12-01

## 2017-12-01 PROBLEM — H10.12 ALLERGIC CONJUNCTIVITIS OF LEFT EYE: Status: RESOLVED | Noted: 2017-09-29 | Resolved: 2017-12-01

## 2017-12-01 PROCEDURE — 99214 OFFICE O/P EST MOD 30 MIN: CPT

## 2017-12-01 RX ORDER — METHYLPREDNISOLONE 4 MG/1
TABLET ORAL
Qty: 1 KIT | Refills: 0 | Status: SHIPPED | OUTPATIENT
Start: 2017-12-01 | End: 2017-12-08 | Stop reason: ALTCHOICE

## 2017-12-01 RX ORDER — LOSARTAN POTASSIUM AND HYDROCHLOROTHIAZIDE 25; 100 MG/1; MG/1
1 TABLET ORAL DAILY
Qty: 90 TABLET | Refills: 1 | Status: SHIPPED | OUTPATIENT
Start: 2017-12-01

## 2017-12-01 RX ORDER — NAPROXEN 500 MG/1
TABLET ORAL
Qty: 180 TABLET | Refills: 0 | OUTPATIENT
Start: 2017-12-01

## 2017-12-01 RX ORDER — NAPROXEN 500 MG/1
500 TABLET ORAL 2 TIMES DAILY WITH MEALS
Qty: 60 TABLET | Refills: 0 | Status: SHIPPED | OUTPATIENT
Start: 2017-12-01

## 2017-12-01 RX ORDER — ALPRAZOLAM 0.25 MG/1
0.25 TABLET ORAL 2 TIMES DAILY PRN
Qty: 30 TABLET | Refills: 0 | Status: SHIPPED | OUTPATIENT
Start: 2017-12-01

## 2017-12-02 NOTE — PATIENT INSTRUCTIONS
La respuesta del cuerpo a la ansiedad     La ansiedad normal es parte del sistema de defensa natural del cuerpo. Es leanna alerta de que estamos ante leanna amenaza desconocida, poco precisa o que proviene de nuestros propios miedos internos.  Mientras usted es La ansiedad puede convertirse en un problema cuando se vuelve difícil de controlar, ocurre por meses e interfiere con partes importantes de francisco daniel.  Si tiene un trastorno de ansiedad, el cuerpo responde de la manera descrita anteriormente shakeel de forma luh · Tenga presente que usted no lo puede controlar todo en leanna situación. Cambie lo que pueda y deje que lo demás siga francisco curso. · Leonor ejercicio: es lenana forma excelente de aliviar la tensión y ayudar a que francisco cuerpo se relaje.   · Evite la cafeína y la zahra

## 2017-12-02 NOTE — PROGRESS NOTES
HPI:    Patient ID: Rafita Archer is a 79year old female. Chest Pain    This is a recurrent problem. Episode onset: few years ago. The problem occurs intermittently (latest episode started few days ago). The problem has been waxing and waning.  The pain i throat. Eyes: Negative for photophobia, discharge and visual disturbance. Respiratory: Negative for cough, hemoptysis, sputum production and shortness of breath. Cardiovascular: Positive for chest pain.  Negative for palpitations, orthopnea, claudic MEALS Disp:  Rfl: 3   MetFORMIN HCl 850 MG Oral Tab Take 1 tablet (850 mg total) by mouth daily with breakfast. Disp: 90 tablet Rfl: 1   Omeprazole 40 MG Oral Capsule Delayed Release Take 1 capsule (40 mg total) by mouth daily.  Disp: 90 capsule Rfl: 1   si Medrol dose pack and Naproxen given. 2. Anxiety  Deep breathing exercises and STOP technique (Stop what you are doing, Talk yourself down, Observe and count to 100, and Praise yourself) reviewed and discussed with pt. Rx for Alprazolam given.     3. Ess

## 2017-12-08 ENCOUNTER — OFFICE VISIT (OUTPATIENT)
Dept: FAMILY MEDICINE CLINIC | Facility: CLINIC | Age: 67
End: 2017-12-08

## 2017-12-08 VITALS
HEART RATE: 87 BPM | DIASTOLIC BLOOD PRESSURE: 80 MMHG | OXYGEN SATURATION: 99 % | WEIGHT: 155 LBS | SYSTOLIC BLOOD PRESSURE: 138 MMHG | BODY MASS INDEX: 26 KG/M2

## 2017-12-08 DIAGNOSIS — M54.50 ACUTE BILATERAL LOW BACK PAIN WITHOUT SCIATICA: Primary | ICD-10-CM

## 2017-12-08 DIAGNOSIS — E11.3293 CONTROLLED TYPE 2 DIABETES MELLITUS WITH BOTH EYES AFFECTED BY MILD NONPROLIFERATIVE RETINOPATHY WITHOUT MACULAR EDEMA, WITHOUT LONG-TERM CURRENT USE OF INSULIN (HCC): ICD-10-CM

## 2017-12-08 PROCEDURE — 99213 OFFICE O/P EST LOW 20 MIN: CPT

## 2017-12-08 RX ORDER — CYCLOBENZAPRINE HCL 10 MG
10 TABLET ORAL 3 TIMES DAILY PRN
Qty: 15 TABLET | Refills: 0 | Status: SHIPPED | OUTPATIENT
Start: 2017-12-08 | End: 2018-02-16

## 2017-12-09 NOTE — PROGRESS NOTES
HPI:    Patient ID: Pari Mehta is a 79year old female. Back Pain   This is a new problem. Episode onset: 3 days ago. The problem occurs constantly. The problem is unchanged. Pain location: both lumbar paraspinal areas.  The quality of the pain is descr breakfast. Disp: 90 tablet Rfl: 1   naproxen 500 MG Oral Tab Take 1 tablet (500 mg total) by mouth 2 (two) times daily with meals.  Disp: 60 tablet Rfl: 0   ALPRAZolam (XANAX) 0.25 MG Oral Tab Take 1 tablet (0.25 mg total) by mouth 2 (two) times daily as ne 1. Acute bilateral low back pain without sciatica  Pt reassured and all questions answered. Clinical course, prognosis, and treatment options of disease process discussed with pt.   Pt advised to take hot showers/baths and/or to apply a heating pad to af

## 2017-12-09 NOTE — PATIENT INSTRUCTIONS
Lumbago: Cómo cuidarse    La mayoría de la gente tiene, de vez en cuando, dolor en la parte inferior de la espalda. En muchos casos no se trata de un problema tonya y el cuidado personal puede aliviarlo.  Gabriela, algunas veces el lumbago (dolor lumbar) pued Llame al médico si tiene alguno de estos síntomas:  · No puede ponerse de pie o caminar. · Tiene leanna fiebre superior a 101.0°F (38.3°C)  · Orina frecuentemente o con dolor, o tiene [employer] · Tiene un dolor abdominal muy intenso.   · Siente un

## 2017-12-12 ENCOUNTER — TELEPHONE (OUTPATIENT)
Dept: FAMILY MEDICINE CLINIC | Facility: CLINIC | Age: 67
End: 2017-12-12

## 2017-12-12 DIAGNOSIS — K29.30 CHRONIC SUPERFICIAL GASTRITIS WITHOUT BLEEDING: ICD-10-CM

## 2017-12-12 RX ORDER — OMEPRAZOLE 40 MG/1
40 CAPSULE, DELAYED RELEASE ORAL DAILY
Qty: 90 CAPSULE | Refills: 0 | Status: SHIPPED | OUTPATIENT
Start: 2017-12-12 | End: 2018-02-16

## 2018-02-12 ENCOUNTER — TELEPHONE (OUTPATIENT)
Dept: FAMILY MEDICINE CLINIC | Facility: CLINIC | Age: 68
End: 2018-02-12

## 2018-02-12 DIAGNOSIS — E11.3293 TYPE 2 DIABETES MELLITUS WITH BOTH EYES AFFECTED BY MILD NONPROLIFERATIVE RETINOPATHY WITHOUT MACULAR EDEMA, WITHOUT LONG-TERM CURRENT USE OF INSULIN (HCC): Primary | ICD-10-CM

## 2018-02-12 NOTE — TELEPHONE ENCOUNTER
Referral got entered and went up for review with her insurance.   Message left on her v/m to call us back by tomorrow to find out the status of her referral.

## 2018-02-16 ENCOUNTER — OFFICE VISIT (OUTPATIENT)
Dept: FAMILY MEDICINE CLINIC | Facility: CLINIC | Age: 68
End: 2018-02-16

## 2018-02-16 VITALS
SYSTOLIC BLOOD PRESSURE: 130 MMHG | DIASTOLIC BLOOD PRESSURE: 70 MMHG | BODY MASS INDEX: 26 KG/M2 | RESPIRATION RATE: 18 BRPM | OXYGEN SATURATION: 97 % | HEART RATE: 76 BPM | WEIGHT: 158 LBS

## 2018-02-16 DIAGNOSIS — M62.838 MUSCLE SPASMS OF NECK: Primary | ICD-10-CM

## 2018-02-16 DIAGNOSIS — K29.30 CHRONIC SUPERFICIAL GASTRITIS WITHOUT BLEEDING: ICD-10-CM

## 2018-02-16 DIAGNOSIS — J30.89 NON-SEASONAL ALLERGIC RHINITIS, UNSPECIFIED TRIGGER: ICD-10-CM

## 2018-02-16 DIAGNOSIS — M54.6 ACUTE RIGHT-SIDED THORACIC BACK PAIN: ICD-10-CM

## 2018-02-16 PROBLEM — Z13.31 DEPRESSION SCREENING: Status: RESOLVED | Noted: 2017-05-20 | Resolved: 2018-02-16

## 2018-02-16 PROBLEM — L30.9 DERMATITIS: Status: RESOLVED | Noted: 2017-02-17 | Resolved: 2018-02-16

## 2018-02-16 PROBLEM — M94.0 COSTOCHONDRITIS: Status: RESOLVED | Noted: 2017-12-01 | Resolved: 2018-02-16

## 2018-02-16 PROBLEM — Z00.00 ENCOUNTER FOR MEDICARE ANNUAL WELLNESS EXAM: Status: RESOLVED | Noted: 2017-05-20 | Resolved: 2018-02-16

## 2018-02-16 PROCEDURE — 99214 OFFICE O/P EST MOD 30 MIN: CPT

## 2018-02-16 RX ORDER — OMEPRAZOLE 40 MG/1
40 CAPSULE, DELAYED RELEASE ORAL DAILY
Qty: 90 CAPSULE | Refills: 0 | Status: SHIPPED | OUTPATIENT
Start: 2018-02-16

## 2018-02-16 RX ORDER — PREDNISONE 20 MG/1
40 TABLET ORAL DAILY
Qty: 10 TABLET | Refills: 0 | Status: SHIPPED | OUTPATIENT
Start: 2018-02-16 | End: 2018-02-21

## 2018-02-16 RX ORDER — CYCLOBENZAPRINE HCL 10 MG
10 TABLET ORAL 3 TIMES DAILY PRN
Qty: 15 TABLET | Refills: 0 | Status: SHIPPED | OUTPATIENT
Start: 2018-02-16 | End: 2018-03-09

## 2018-02-16 RX ORDER — AZELASTINE HCL 205.5 UG/1
2 SPRAY NASAL DAILY
Qty: 30 ML | Refills: 0 | Status: SHIPPED | OUTPATIENT
Start: 2018-02-16

## 2018-02-16 NOTE — PROGRESS NOTES
HPI:    Patient ID: Edenilson Major is a 79year old female. Pain   This is a new (over neck and R upper back area) problem. Episode onset: 1 week ago. The problem occurs daily. The problem has been unchanged.  Associated symptoms include arthralgias, conges Musculoskeletal: Positive for arthralgias, back pain, myalgias, neck pain and neck stiffness. Negative for joint swelling. Skin: Negative for rash.    Neurological: Negative for dizziness, vertigo, syncope, weakness, light-headedness, numbness and heada 1 tablet (325 mg total) by mouth 2 (two) times daily with meals. Disp: 180 tablet Rfl: 3   Brimonidine Tartrate-Timolol 0.2-0.5 % Ophthalmic Solution Place 1 drop into both eyes every 12 (twelve) hours.  Disp:  Rfl:      Allergies:No Known Allergies   PHYSI gastritis without bleeding  Refill for Omeprazole given. RTC if symptoms worsen or fail to improve. No orders of the defined types were placed in this encounter.       Meds This Visit:  Signed Prescriptions Disp Refills    Cyclobenzaprine HCl 10 MG

## 2018-02-17 NOTE — PATIENT INSTRUCTIONS
¿Qué son las alergias nasales? Las alergias nasales (llamadas también rinitis alérgica) son un problema de trish común.  Pueden ser estacionales, con lo cual blake síntomas aparecen solo en determinadas épocas del año, o perennes, en cuyo stan blake síntomas · Dolor e hinchazón de los senos paranasales  · Dolor de rashi  Fili vez no se trate de leanna alergia nasal  Hay otros problemas que pueden causar síntomas similares a los de las alergias nasales, por ejemplo:  · la rinitis no alérgica y los virus, fili amie

## 2018-03-09 ENCOUNTER — OFFICE VISIT (OUTPATIENT)
Dept: FAMILY MEDICINE CLINIC | Facility: CLINIC | Age: 68
End: 2018-03-09

## 2018-03-09 VITALS
HEIGHT: 59.5 IN | BODY MASS INDEX: 30.84 KG/M2 | DIASTOLIC BLOOD PRESSURE: 80 MMHG | HEART RATE: 74 BPM | WEIGHT: 155 LBS | SYSTOLIC BLOOD PRESSURE: 130 MMHG | OXYGEN SATURATION: 98 %

## 2018-03-09 DIAGNOSIS — D50.9 MICROCYTIC ANEMIA: ICD-10-CM

## 2018-03-09 DIAGNOSIS — E78.2 MIXED HYPERLIPIDEMIA: ICD-10-CM

## 2018-03-09 DIAGNOSIS — Z01.89 ENCOUNTER FOR ROUTINE LABORATORY TESTING: ICD-10-CM

## 2018-03-09 DIAGNOSIS — J06.9 VIRAL UPPER RESPIRATORY TRACT INFECTION: ICD-10-CM

## 2018-03-09 DIAGNOSIS — M62.838 MUSCLE SPASMS OF NECK: ICD-10-CM

## 2018-03-09 DIAGNOSIS — E11.3293 CONTROLLED TYPE 2 DIABETES MELLITUS WITH BOTH EYES AFFECTED BY MILD NONPROLIFERATIVE RETINOPATHY WITHOUT MACULAR EDEMA, WITHOUT LONG-TERM CURRENT USE OF INSULIN (HCC): Primary | ICD-10-CM

## 2018-03-09 PROBLEM — M54.50 ACUTE BILATERAL LOW BACK PAIN WITHOUT SCIATICA: Status: RESOLVED | Noted: 2017-12-08 | Resolved: 2018-03-09

## 2018-03-09 PROBLEM — M47.812 DJD (DEGENERATIVE JOINT DISEASE) OF CERVICAL SPINE: Status: ACTIVE | Noted: 2017-08-04

## 2018-03-09 PROBLEM — H90.5 SENSORINEURAL HEARING LOSS (SNHL) OF LEFT EAR: Status: ACTIVE | Noted: 2017-04-21

## 2018-03-09 LAB
ALBUMIN SERPL BCP-MCNC: 4.4 G/DL (ref 3.5–4.8)
ALBUMIN/GLOB SERPL: 1.6 {RATIO} (ref 1–2)
ALP SERPL-CCNC: 64 U/L (ref 32–100)
ALT SERPL-CCNC: 33 U/L (ref 14–54)
ANION GAP SERPL CALC-SCNC: 11 MMOL/L (ref 0–18)
AST SERPL-CCNC: 34 U/L (ref 15–41)
BILIRUB SERPL-MCNC: 0.7 MG/DL (ref 0.3–1.2)
BUN SERPL-MCNC: 22 MG/DL (ref 8–20)
BUN/CREAT SERPL: 27.8 (ref 10–20)
CALCIUM SERPL-MCNC: 9.5 MG/DL (ref 8.5–10.5)
CHLORIDE SERPL-SCNC: 104 MMOL/L (ref 95–110)
CO2 SERPL-SCNC: 24 MMOL/L (ref 22–32)
CREAT SERPL-MCNC: 0.79 MG/DL (ref 0.5–1.5)
GLOBULIN PLAS-MCNC: 2.7 G/DL (ref 2.5–3.7)
GLUCOSE SERPL-MCNC: 139 MG/DL (ref 70–99)
OSMOLALITY UR CALC.SUM OF ELEC: 294 MOSM/KG (ref 275–295)
PATIENT FASTING: YES
POTASSIUM SERPL-SCNC: 4.6 MMOL/L (ref 3.3–5.1)
PROT SERPL-MCNC: 7.1 G/DL (ref 5.9–8.4)
SODIUM SERPL-SCNC: 139 MMOL/L (ref 136–144)

## 2018-03-09 PROCEDURE — 36415 COLL VENOUS BLD VENIPUNCTURE: CPT

## 2018-03-09 PROCEDURE — 99214 OFFICE O/P EST MOD 30 MIN: CPT

## 2018-03-09 PROCEDURE — 80053 COMPREHEN METABOLIC PANEL: CPT

## 2018-03-09 PROCEDURE — 83036 HEMOGLOBIN GLYCOSYLATED A1C: CPT

## 2018-03-09 RX ORDER — PROMETHAZINE HYDROCHLORIDE AND CODEINE PHOSPHATE 6.25; 1 MG/5ML; MG/5ML
5 SYRUP ORAL EVERY 6 HOURS PRN
Qty: 240 ML | Refills: 0 | Status: SHIPPED | OUTPATIENT
Start: 2018-03-09

## 2018-03-09 RX ORDER — CYCLOBENZAPRINE HCL 10 MG
10 TABLET ORAL 3 TIMES DAILY PRN
Qty: 15 TABLET | Refills: 0 | Status: SHIPPED | OUTPATIENT
Start: 2018-03-09

## 2018-03-09 NOTE — PROGRESS NOTES
HPI:    Patient ID: Don Read is a 79year old female. Diabetes   She presents for her follow-up diabetic visit. She has type 2 diabetes mellitus. Her disease course has been stable. There are no hypoglycemic associated symptoms.  Pertinent negatives f blurred vision. Negative for photophobia, discharge and visual disturbance. Respiratory: Positive for cough. Negative for hemoptysis, shortness of breath and wheezing. Cardiovascular: Negative for chest pain and palpitations.    Gastrointestinal: Negat into both eyes 2 (two) times daily. Disp: 1 Bottle Rfl: 0   triamcinolone acetonide 0.025 % External Cream Apply 1 Application topically 2 (two) times daily. Disp: 30 g Rfl: 0   simvastatin 20 MG Oral Tab Take 1 tablet (20 mg total) by mouth nightly.  Disp: retinopathy without macular edema, without long-term current use of insulin ([facility] [address]  Pt reassured and all questions answered. Latest Hgb A1c controlled @ 6.2. Pt counseled with regards to diet and exercise.   Continue Metformin as previously instructed for

## 2018-03-10 DIAGNOSIS — E11.3293 CONTROLLED TYPE 2 DIABETES MELLITUS WITH BOTH EYES AFFECTED BY MILD NONPROLIFERATIVE RETINOPATHY WITHOUT MACULAR EDEMA, WITHOUT LONG-TERM CURRENT USE OF INSULIN (HCC): ICD-10-CM

## 2018-03-10 LAB — HBA1C MFR BLD: 6.8 % (ref 4–6)

## 2018-03-10 NOTE — PATIENT INSTRUCTIONS
Terapia oral para la diabetes tipo 2  Las pastillas contra la diabetes pueden ayudarle a controlar francisco nivel de azúcar en la mita. Estas pastillas no contienen insulina, shakeel controlan el nivel de azúcar de varias otras [place].  Es posible que le den un Estas pastillas hacen más lenta la digestión de azúcares y féculas, y pueden ayudarle a evitar que francisco nivel de azúcar suba demasiado después de leanna comida.  Tómelas con el primer bocado de cada comida principal. Entre los posibles efectos secundarios se enc Estas pastillas ayudan a bajar los niveles de azúcar en la mita en personas con diabetes tipo 2 mediante el aumento de la cantidad de azúcar que se filtra en la orina.  Los posibles efectos secundarios incluyen:  · [address] vías urinarias  · Inf

## 2018-04-06 ENCOUNTER — OFFICE VISIT (OUTPATIENT)
Dept: FAMILY MEDICINE CLINIC | Facility: CLINIC | Age: 68
End: 2018-04-06

## 2018-04-06 VITALS
OXYGEN SATURATION: 97 % | SYSTOLIC BLOOD PRESSURE: 132 MMHG | HEART RATE: 80 BPM | DIASTOLIC BLOOD PRESSURE: 72 MMHG | TEMPERATURE: 99 F

## 2018-04-06 DIAGNOSIS — J40 BRONCHITIS: Primary | ICD-10-CM

## 2018-04-06 DIAGNOSIS — J01.10 ACUTE NON-RECURRENT FRONTAL SINUSITIS: ICD-10-CM

## 2018-04-06 PROCEDURE — 99203 OFFICE O/P NEW LOW 30 MIN: CPT

## 2018-04-06 RX ORDER — LEVOFLOXACIN 500 MG/1
500 TABLET, FILM COATED ORAL DAILY
Qty: 10 TABLET | Refills: 0 | Status: SHIPPED | OUTPATIENT
Start: 2018-04-06 | End: 2018-04-16

## 2018-04-06 NOTE — PATIENT INSTRUCTIONS
La sinusitis aguda    La sinusitis aguda es leanna inflamación (irritación e hinchazón) de los senos paranasales. Por lo general es producida por leanna infección viral después de un resfriado común. Rangel médico puede ayudarle a obtener alivio para blake síntomas. El tratamiento de la sinusitis aguda está diseñado para aliviar la obstrucción de la abertura de los senos y ayudar a que los cilios Rooney Olives a funcionar.  Con antihistamínicos y descongestionantes se puede reducir la inflamación y Sant[employer] producción de

## 2018-04-06 NOTE — PROGRESS NOTES
CHIEF COMPLAINT:   Patient presents with:  Cough/URI: since 4-5 days    HPI:   Nicol Locke is a 79year old female with hx of cold symptoms that have progressed to left frontal and maxillary sinus congestion and pressure.  Reports fatigue, with fever and ch simvastatin 20 MG Oral Tab Take 1 tablet (20 mg total) by mouth nightly. Disp: 90 tablet Rfl: 1   Calcium Carb-Cholecalciferol (CALCIUM-VITAMIN D3) 600-400 MG-UNIT Oral Tab Take 1 tablet by mouth 2 (two) times daily.  Disp: 180 tablet Rfl: 3   ferrous sulfa NOSE: No exudates, nasal mucosa is erythematous and swollen. Left  Frontal sinus pain  THROAT: Oral mucosa pink, moist. Posterior pharynx is mildly erythematous. No exudates, but mucoid.  Tonsils not seen  NECK: Supple, non-tender  LUNGS: clear to auscultat Los senos paranasales son espacios llenos de aire dentro del cráneo, detrás de la jadyn. Se mantienen húmedos y limpios bruce a francisco recubrimiento de mucosa.  Cuando el recubrimiento de mucosa se expone a polen, humo u otros agentes irritantes puede inflamar Date Last Reviewed: 5/16/2014  © 4024-1575 [employer]. [facility], [facility]. Todos los derechos reservados.  Esta información no pretende sustituir la atención médica profesional. Sólo francisco médico puede diagnosticar y trata

## 2018-11-10 ENCOUNTER — TELEPHONE (OUTPATIENT)
Dept: FAMILY MEDICINE CLINIC | Facility: CLINIC | Age: 68
End: 2018-11-10

## 2022-11-13 NOTE — TELEPHONE ENCOUNTER
Patient's current insurance is [facility] and Dr Mitchell Ramos no longer takes it. New referral was issued for her to go see Dr Abdon Silva. Opt out

## (undated) NOTE — MR AVS SNAPSHOT
[address] at [facility]  [address]  [facility], [address] [facility] [place]                Thank you for choosing us for your health care visit with Bonny Ortega MD.  We are glad to serve you and happy to boy Omeprazole 40 MG Cpdr   Take 1 capsule (40 mg total) by mouth daily. simvastatin 20 MG Tabs   Take 1 tablet (20 mg total) by mouth nightly.    Commonly known as:  ZOCOR                Where to Get Your Medications      These medications were sent

## (undated) NOTE — MR AVS SNAPSHOT
[address] at [facility]  [address]  [facility], [address] [facility] [place]                Thank you for choosing us for your health care visit with Keila Felix MD.  We are glad to serve you and happy to boy **REFERRAL REQUEST**    Your physician has referred you to a specialist.  Your physician or the clinic staff will provide you with the phone number you should call to schedule your appointment.      If you are confident that your benefit plan will not irritate nearby nasal tissue. This causes nasal allergy symptoms. Common nasal allergy symptoms  Allergies can cause nasal tissue to swell. This makes the air passages smaller. The nose may feel stuffed up.  The nose may also make extra mucus, which can pl This list is accurate as of: 4/21/17 10:26 PM.  Always use your most recent med list.                Azelastine HCl 0.15 % Soln   2 sprays by Nasal route 2 (two) times daily.            Brimonidine Tartrate-Timolol 0.2-0.5 % Soln   Place 1 drop into both ey If you have questions, you can call [phone] to talk to our [facility] Staff. Remember, Attender is NOT to be used for urgent needs. For medical emergencies, dial 911. Visit [email or web address] to learn more.            Visit EDWARD-E

## (undated) NOTE — MR AVS SNAPSHOT
[address] at [facility]  [address]  [facility], [address] [facility] [place]               Thank you for choosing us for your health care visit with Wilfredo Lucas MD.  We are glad to serve you and happy to boy Diagnoses:  Bilateral hearing loss, unspecified hearing loss type   Deviated nasal septum   Non-seasonal allergic rhinitis, unspecified allergic rhinitis trigger   Order:  Ent - Internal    MD Al[address]   [place] [id number] Non-seasonal allergic rhinitis    Osteopenia    Postmenopausal    Encounter for Medicare annual wellness exam    -  Primary    Depression screening          Instructions and Information about Your Health     None      Allergies as of May 19, 2017     No K - Calcium-Vitamin D3 600-400 MG-UNIT Tabs  - Losartan Potassium-HCTZ 100-25 MG Tabs  - MetFORMIN HCl 850 MG Tabs  - Omeprazole 40 MG Cpdr  - simvastatin 20 MG Tabs      You can get these medications from any pharmacy     Bring a paper prescription for eac

## (undated) NOTE — MR AVS SNAPSHOT
[address] at [facility]  [address]  [facility], [address] [facility] [place]                Thank you for choosing us for your health care visit with Fanny Hernandez MD.  We are glad to serve you and happy to boy mupirocin 2 % Oint   Apply 1 Application topically 2 (two) times daily. Commonly known as:  BACTROBAN           Omeprazole 40 MG Cpdr   Take 1 capsule (40 mg total) by mouth daily.            predniSONE 20 MG Tabs   Take 3 tablets (60 mg total) by mouth

## (undated) NOTE — MR AVS SNAPSHOT
[address] at [address] [place] [address].  [address]  [place] [address]  [phone]               Thank you for choosing us for your health care visit with Sissy Kapoor MD.  We are glad to serve you and happy to prov Apply 1 Application topically 2 (two) times daily. Commonly known as:  BACTROBAN           Omeprazole 40 MG Cpdr   Take 1 capsule (40 mg total) by mouth daily.                 Where to Get Your Medications      These medications were sent to [address] [place] [address]

## (undated) NOTE — MR AVS SNAPSHOT
[address] at [facility]  [address], [address] [facility]               Thank you for choosing us for your health care visit with Valentina Sommers MD.  We are glad to serve you and happy to boy Take 1 tablet by mouth daily. Commonly known as:  HYZAAR           MetFORMIN HCl 850 MG Tabs   Take 1 tablet (850 mg total) by mouth daily with breakfast.   Commonly known as:  GLUCOPHAGE           methylPREDNISolone 4 MG Tbpk   As directed.    Commonly k physician's office. At that time, you will be provided with any authorization numbers or be assured that none are required. You can then schedule your appointment.  Failure to obtain required authorization numbers can create reimbursement difficulties for y Don’t forget strength training with weights and resistance Set goals and track your progress   You don’t need to join a gym. Home exercises work great.  Put more priority on exercise in your life                    Visit [facility] online at